# Patient Record
Sex: FEMALE | Race: WHITE | NOT HISPANIC OR LATINO
[De-identification: names, ages, dates, MRNs, and addresses within clinical notes are randomized per-mention and may not be internally consistent; named-entity substitution may affect disease eponyms.]

---
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
## 2024-08-27 ENCOUNTER — RX RENEWAL (OUTPATIENT)
Age: 19
End: 2024-08-27

## 2024-08-27 RX ORDER — NORGESTIMATE AND ETHINYL ESTRADIOL 7DAYSX3 LO
0.18/0.215/0.25 KIT ORAL
Qty: 1 | Refills: 0 | Status: ACTIVE | COMMUNITY
Start: 2024-08-27 | End: 1900-01-01

## 2024-09-13 ENCOUNTER — RX RENEWAL (OUTPATIENT)
Age: 19
End: 2024-09-13

## 2024-09-23 ENCOUNTER — RX RENEWAL (OUTPATIENT)
Age: 19
End: 2024-09-23